# Patient Record
(demographics unavailable — no encounter records)

---

## 2024-10-18 NOTE — PLAN
[FreeTextEntry1] : Discussed the option of continued conservative observation of fibroids vs surgical removal. Treatment options of myomectomy, hysterectomy, ufe and continued observation were also outlined. A detailed discussion regarding the option of myomectomy vs hysterectomy was also held and the risks, benefits, and alternatives provided. All questions answered discussed the risks including but not limited to need for laparotomy, blood transfusion , possible postop infection, and injury to to GI or  tract.  Pt to schedule [facility] to have preop mri. During this visit 40 minutes were spent face-to-face with greater than 50% of the time dedicated to counseling.

## 2024-10-18 NOTE — HISTORY OF PRESENT ILLNESS
[FreeTextEntry1] : Pt is a 53 y/o  referred by Dr. Singh for fibroid uterus and adenomyosis. Pt has been aware of her fibroids for the past year and was told she should have a hysterectomy. Pt did not want a hysterectomy at that time so deferred management as she would like to wait until menopause to see how she feels at that time. In  she had heavy, long menses that then stopped for a few months. Since they resumed, her periods are typically monthly but have improved in terms of bleeding. She had an EMB  that was benign with a 1.5mm Endocervical polyp.  She has no complaints in terms of pain/bleeding at this time. She does state that she has symptoms of stress incontinence. No issues with bowel movements. Patient is considering lupron vs surgery at this time.  Of note, in December she was admitted to [facility] with E Coli bacteremia and had an MRI performed during that admission showing the following:  FINDINGS: UTERUS: 19.4 x 9.6 x 13.2 cm. Dominant subserosal hypoenhancing fibroid with central cystic degeneration in the lower uterine segment measuring 11.6 x 10.6 x 12.0 cm. Multiple additional fibroids, including a right-sided intramural avidly enhancing fibroid measuring 5.3 x 4.3 x 5.6 cm and a left-sided intramural hypoenhancing fibroid measuring 3.0 x 1.7 cm. Multiple cervical nabothian cysts.. ENDOMETRIUM: 7 mm. JUNCTIONAL ZONE: Thickening of the junctional zone compatible with adenomyosis. RIGHT OVARY: 2.7 x 1.9 x 2.5 cm simple cyst. LEFT OVARY: Within normal limits. ADNEXA: Within normal limits.

## 2024-10-18 NOTE — PHYSICAL EXAM
[Chaperone Present] : A chaperone was present in the examining room during all aspects of the physical examination [FreeTextEntry2] : CHERY [Appropriately responsive] : appropriately responsive [Alert] : alert [No Acute Distress] : no acute distress [Soft] : soft [No Lesions] : no lesions [FreeTextEntry7] : 24 wk uterus

## 2024-10-18 NOTE — HISTORY OF PRESENT ILLNESS
[FreeTextEntry1] : Pt is a 55 y/o  referred by Dr. Singh for fibroid uterus and adenomyosis. Pt has been aware of her fibroids for the past year and was told she should have a hysterectomy. Pt did not want a hysterectomy at that time so deferred management as she would like to wait until menopause to see how she feels at that time. In  she had heavy, long menses that then stopped for a few months. Since they resumed, her periods are typically monthly but have improved in terms of bleeding. She had an EMB  that was benign with a 1.5mm Endocervical polyp.  She has no complaints in terms of pain/bleeding at this time. She does state that she has symptoms of stress incontinence. No issues with bowel movements. Patient is considering lupron vs surgery at this time.  Of note, in December she was admitted to [facility] with E Coli bacteremia and had an MRI performed during that admission showing the following:  FINDINGS: UTERUS: 19.4 x 9.6 x 13.2 cm. Dominant subserosal hypoenhancing fibroid with central cystic degeneration in the lower uterine segment measuring 11.6 x 10.6 x 12.0 cm. Multiple additional fibroids, including a right-sided intramural avidly enhancing fibroid measuring 5.3 x 4.3 x 5.6 cm and a left-sided intramural hypoenhancing fibroid measuring 3.0 x 1.7 cm. Multiple cervical nabothian cysts.. ENDOMETRIUM: 7 mm. JUNCTIONAL ZONE: Thickening of the junctional zone compatible with adenomyosis. RIGHT OVARY: 2.7 x 1.9 x 2.5 cm simple cyst. LEFT OVARY: Within normal limits. ADNEXA: Within normal limits.

## 2024-11-23 NOTE — HISTORY OF PRESENT ILLNESS
[Pain is well-controlled] : pain is well-controlled [Fever] : no fever [Chills] : no chills [Nausea] : no nausea [Vomiting] : no vomiting [Clean/Dry/Intact] : clean, dry and intact [Erythema] : not erythematous [None] : no vaginal bleeding [Normal] : normal [Pathology reviewed] : pathology reviewed [de-identified] : doing well sp [facility]

## 2024-11-23 NOTE — HISTORY OF PRESENT ILLNESS
[Pain is well-controlled] : pain is well-controlled [Fever] : no fever [Chills] : no chills [Nausea] : no nausea [Vomiting] : no vomiting [Clean/Dry/Intact] : clean, dry and intact [Erythema] : not erythematous [None] : no vaginal bleeding [Normal] : normal [Pathology reviewed] : pathology reviewed [de-identified] : doing well sp [facility] bs